# Patient Record
Sex: FEMALE | Race: WHITE | NOT HISPANIC OR LATINO | URBAN - METROPOLITAN AREA
[De-identification: names, ages, dates, MRNs, and addresses within clinical notes are randomized per-mention and may not be internally consistent; named-entity substitution may affect disease eponyms.]

---

## 2018-11-03 ENCOUNTER — EMERGENCY (EMERGENCY)
Facility: HOSPITAL | Age: 53
LOS: 1 days | Discharge: HOME | End: 2018-11-03
Payer: COMMERCIAL

## 2018-11-03 VITALS
HEART RATE: 62 BPM | RESPIRATION RATE: 17 BRPM | OXYGEN SATURATION: 99 % | WEIGHT: 130.07 LBS | TEMPERATURE: 98 F | SYSTOLIC BLOOD PRESSURE: 102 MMHG | DIASTOLIC BLOOD PRESSURE: 68 MMHG | HEIGHT: 68 IN

## 2018-11-03 VITALS
SYSTOLIC BLOOD PRESSURE: 124 MMHG | HEART RATE: 55 BPM | TEMPERATURE: 97 F | DIASTOLIC BLOOD PRESSURE: 72 MMHG | RESPIRATION RATE: 14 BRPM | OXYGEN SATURATION: 100 %

## 2018-11-03 LAB
ALBUMIN SERPL ELPH-MCNC: 4.1 G/DL — SIGNIFICANT CHANGE UP (ref 3.3–5)
ALP SERPL-CCNC: 56 U/L — SIGNIFICANT CHANGE UP (ref 30–120)
ALT FLD-CCNC: 20 U/L DA — SIGNIFICANT CHANGE UP (ref 10–60)
ANION GAP SERPL CALC-SCNC: 8 MMOL/L — SIGNIFICANT CHANGE UP (ref 5–17)
APPEARANCE UR: CLEAR — SIGNIFICANT CHANGE UP
APPEARANCE UR: CLEAR — SIGNIFICANT CHANGE UP
AST SERPL-CCNC: 13 U/L — SIGNIFICANT CHANGE UP (ref 10–40)
BACTERIA # UR AUTO: ABNORMAL
BACTERIA # UR AUTO: ABNORMAL
BASOPHILS # BLD AUTO: 0.04 K/UL — SIGNIFICANT CHANGE UP (ref 0–0.2)
BASOPHILS NFR BLD AUTO: 0.5 % — SIGNIFICANT CHANGE UP (ref 0–2)
BILIRUB SERPL-MCNC: 0.3 MG/DL — SIGNIFICANT CHANGE UP (ref 0.2–1.2)
BILIRUB UR-MCNC: NEGATIVE — SIGNIFICANT CHANGE UP
BILIRUB UR-MCNC: NEGATIVE — SIGNIFICANT CHANGE UP
BUN SERPL-MCNC: 13 MG/DL — SIGNIFICANT CHANGE UP (ref 7–23)
CALCIUM SERPL-MCNC: 9.1 MG/DL — SIGNIFICANT CHANGE UP (ref 8.4–10.5)
CANCER AG125 SERPL-ACNC: 11 U/ML — SIGNIFICANT CHANGE UP
CHLORIDE SERPL-SCNC: 105 MMOL/L — SIGNIFICANT CHANGE UP (ref 96–108)
CO2 SERPL-SCNC: 30 MMOL/L — SIGNIFICANT CHANGE UP (ref 22–31)
COLOR SPEC: YELLOW — SIGNIFICANT CHANGE UP
COLOR SPEC: YELLOW — SIGNIFICANT CHANGE UP
CREAT SERPL-MCNC: 0.76 MG/DL — SIGNIFICANT CHANGE UP (ref 0.5–1.3)
DIFF PNL FLD: ABNORMAL
DIFF PNL FLD: ABNORMAL
EOSINOPHIL # BLD AUTO: 0.12 K/UL — SIGNIFICANT CHANGE UP (ref 0–0.5)
EOSINOPHIL NFR BLD AUTO: 1.6 % — SIGNIFICANT CHANGE UP (ref 0–6)
EPI CELLS # UR: SIGNIFICANT CHANGE UP
EPI CELLS # UR: SIGNIFICANT CHANGE UP
ERYTHROCYTE [SEDIMENTATION RATE] IN BLOOD: 3 MM/HR — SIGNIFICANT CHANGE UP (ref 0–20)
GLUCOSE SERPL-MCNC: 96 MG/DL — SIGNIFICANT CHANGE UP (ref 70–99)
GLUCOSE UR QL: NEGATIVE MG/DL — SIGNIFICANT CHANGE UP
GLUCOSE UR QL: NEGATIVE MG/DL — SIGNIFICANT CHANGE UP
HCT VFR BLD CALC: 38.4 % — SIGNIFICANT CHANGE UP (ref 34.5–45)
HGB BLD-MCNC: 12.8 G/DL — SIGNIFICANT CHANGE UP (ref 11.5–15.5)
IMM GRANULOCYTES NFR BLD AUTO: 0.5 % — SIGNIFICANT CHANGE UP (ref 0–1.5)
KETONES UR-MCNC: NEGATIVE — SIGNIFICANT CHANGE UP
KETONES UR-MCNC: NEGATIVE — SIGNIFICANT CHANGE UP
LEUKOCYTE ESTERASE UR-ACNC: ABNORMAL
LEUKOCYTE ESTERASE UR-ACNC: ABNORMAL
LYMPHOCYTES # BLD AUTO: 1.52 K/UL — SIGNIFICANT CHANGE UP (ref 1–3.3)
LYMPHOCYTES # BLD AUTO: 20.6 % — SIGNIFICANT CHANGE UP (ref 13–44)
MCHC RBC-ENTMCNC: 30.2 PG — SIGNIFICANT CHANGE UP (ref 27–34)
MCHC RBC-ENTMCNC: 33.3 GM/DL — SIGNIFICANT CHANGE UP (ref 32–36)
MCV RBC AUTO: 90.6 FL — SIGNIFICANT CHANGE UP (ref 80–100)
MONOCYTES # BLD AUTO: 0.4 K/UL — SIGNIFICANT CHANGE UP (ref 0–0.9)
MONOCYTES NFR BLD AUTO: 5.4 % — SIGNIFICANT CHANGE UP (ref 2–14)
NEUTROPHILS # BLD AUTO: 5.27 K/UL — SIGNIFICANT CHANGE UP (ref 1.8–7.4)
NEUTROPHILS NFR BLD AUTO: 71.4 % — SIGNIFICANT CHANGE UP (ref 43–77)
NITRITE UR-MCNC: NEGATIVE — SIGNIFICANT CHANGE UP
NITRITE UR-MCNC: NEGATIVE — SIGNIFICANT CHANGE UP
NRBC # BLD: 0 /100 WBCS — SIGNIFICANT CHANGE UP (ref 0–0)
PH UR: 5 — SIGNIFICANT CHANGE UP (ref 5–8)
PH UR: 7 — SIGNIFICANT CHANGE UP (ref 5–8)
PLATELET # BLD AUTO: 196 K/UL — SIGNIFICANT CHANGE UP (ref 150–400)
POTASSIUM SERPL-MCNC: 4.2 MMOL/L — SIGNIFICANT CHANGE UP (ref 3.5–5.3)
POTASSIUM SERPL-SCNC: 4.2 MMOL/L — SIGNIFICANT CHANGE UP (ref 3.5–5.3)
PROT SERPL-MCNC: 7.2 G/DL — SIGNIFICANT CHANGE UP (ref 6–8.3)
PROT UR-MCNC: NEGATIVE MG/DL — SIGNIFICANT CHANGE UP
PROT UR-MCNC: NEGATIVE MG/DL — SIGNIFICANT CHANGE UP
RBC # BLD: 4.24 M/UL — SIGNIFICANT CHANGE UP (ref 3.8–5.2)
RBC # FLD: 12.4 % — SIGNIFICANT CHANGE UP (ref 10.3–14.5)
RBC CASTS # UR COMP ASSIST: ABNORMAL /HPF (ref 0–4)
RBC CASTS # UR COMP ASSIST: SIGNIFICANT CHANGE UP /HPF (ref 0–4)
SODIUM SERPL-SCNC: 143 MMOL/L — SIGNIFICANT CHANGE UP (ref 135–145)
SP GR SPEC: 1.01 — SIGNIFICANT CHANGE UP (ref 1.01–1.02)
SP GR SPEC: 1.02 — SIGNIFICANT CHANGE UP (ref 1.01–1.02)
UROBILINOGEN FLD QL: 1 MG/DL
UROBILINOGEN FLD QL: NEGATIVE MG/DL — SIGNIFICANT CHANGE UP
WBC # BLD: 7.39 K/UL — SIGNIFICANT CHANGE UP (ref 3.8–10.5)
WBC # FLD AUTO: 7.39 K/UL — SIGNIFICANT CHANGE UP (ref 3.8–10.5)
WBC UR QL: ABNORMAL
WBC UR QL: SIGNIFICANT CHANGE UP

## 2018-11-03 PROCEDURE — 84480 ASSAY TRIIODOTHYRONINE (T3): CPT

## 2018-11-03 PROCEDURE — 86304 IMMUNOASSAY TUMOR CA 125: CPT

## 2018-11-03 PROCEDURE — 84443 ASSAY THYROID STIM HORMONE: CPT

## 2018-11-03 PROCEDURE — 85027 COMPLETE CBC AUTOMATED: CPT

## 2018-11-03 PROCEDURE — 84436 ASSAY OF TOTAL THYROXINE: CPT

## 2018-11-03 PROCEDURE — 81001 URINALYSIS AUTO W/SCOPE: CPT

## 2018-11-03 PROCEDURE — 86618 LYME DISEASE ANTIBODY: CPT

## 2018-11-03 PROCEDURE — 71046 X-RAY EXAM CHEST 2 VIEWS: CPT | Mod: 26

## 2018-11-03 PROCEDURE — 80053 COMPREHEN METABOLIC PANEL: CPT

## 2018-11-03 PROCEDURE — 71046 X-RAY EXAM CHEST 2 VIEWS: CPT

## 2018-11-03 PROCEDURE — 93005 ELECTROCARDIOGRAM TRACING: CPT

## 2018-11-03 PROCEDURE — 85652 RBC SED RATE AUTOMATED: CPT

## 2018-11-03 PROCEDURE — 93010 ELECTROCARDIOGRAM REPORT: CPT

## 2018-11-03 PROCEDURE — 99284 EMERGENCY DEPT VISIT MOD MDM: CPT

## 2018-11-03 PROCEDURE — 36415 COLL VENOUS BLD VENIPUNCTURE: CPT

## 2018-11-03 PROCEDURE — 99283 EMERGENCY DEPT VISIT LOW MDM: CPT | Mod: 25

## 2018-11-03 RX ORDER — AMOXICILLIN 250 MG/5ML
0 SUSPENSION, RECONSTITUTED, ORAL (ML) ORAL
Qty: 0 | Refills: 0 | COMMUNITY

## 2018-11-03 RX ORDER — SODIUM CHLORIDE 9 MG/ML
1000 INJECTION INTRAMUSCULAR; INTRAVENOUS; SUBCUTANEOUS ONCE
Qty: 0 | Refills: 0 | Status: COMPLETED | OUTPATIENT
Start: 2018-11-03 | End: 2018-11-03

## 2018-11-03 RX ADMIN — SODIUM CHLORIDE 1000 MILLILITER(S): 9 INJECTION INTRAMUSCULAR; INTRAVENOUS; SUBCUTANEOUS at 17:00

## 2018-11-03 NOTE — ED ADULT TRIAGE NOTE - NSWEIGHTCALCTOOLDRUG_GEN_A_CORE
[facility] [place] INTERNAL MEDICINE  SHARRI FARRIS M.D.      Maria Esther FLORENCE Godfrey / 48 y.o. / female  08/23/2018      ASSESSMENT & PLAN:    Problem List Items Addressed This Visit        High    Morbid obesity (CMS/HCC) - Primary (Chronic)    Current Assessment & Plan     · The following were discussed: low calorie, low carb based diet program, portion control, make dinner lightest meal of day, avoid eating sweets, desserts, and excess amount of fruits and increase physical activity, monitor steps taken daily            Relevant Orders    Hemoglobin A1c    Lipid Panel With / Chol / HDL Ratio    Comprehensive Metabolic Panel    TSH+Free T4    CBC & Differential       Medium    Asthma (Chronic)    Overview     Albuterol HFA prn.          Current Assessment & Plan     Rx sent to pharmacy for albuterol HFA inhaler.           Relevant Medications    albuterol (VENTOLIN HFA) 108 (90 Base) MCG/ACT inhaler       Low    Environmental and seasonal allergies (Chronic)    Current Assessment & Plan     Recommended Zyrtec/Allegra daily. Rx sent to pharmacy for Flonase spray.          Relevant Medications    fluticasone (FLONASE) 50 MCG/ACT nasal spray    Attention deficit hyperactivity disorder (ADHD), combined type (Chronic)    Overview     *Sees psychiatrist (Johnson)    On Vyvanse 50 mg daily. Continue follow-up with psychiatrist.          Relevant Medications    lisdexamfetamine (VYVANSE) 50 MG capsule    Chronic left-sided low back pain (Chronic)    Current Assessment & Plan     Persistent sacral and lumbar region back pain. Xray from [facility] reviewed showing spondylosis but no vertebral compression fracture. Started after a fall previously. Recommended PT and OTC NSAIDs as needed.            Other Visit Diagnoses     Snoring        Relevant Orders    Ambulatory Referral to Sleep Medicine    Routine health maintenance        Relevant Orders    Ambulatory Referral to Gynecology    Mammo Screening Bilateral With CAD        Orders Placed This Encounter  "  Procedures   • Mammo Screening Bilateral With CAD   • Hemoglobin A1c   • Lipid Panel With / Chol / HDL Ratio   • Comprehensive Metabolic Panel   • TSH+Free T4   • Ambulatory Referral to Gynecology   • Ambulatory Referral to Sleep Medicine   • CBC & Differential     New Medications Ordered This Visit   Medications   • albuterol (VENTOLIN HFA) 108 (90 Base) MCG/ACT inhaler     Sig: Inhale 2 puffs Every 4 (Four) Hours As Needed for Wheezing or Shortness of Air.     Dispense:  1 inhaler     Refill:  2     May substitute to a different brand if needed for insurance formulary reasons.   • fluticasone (FLONASE) 50 MCG/ACT nasal spray     Si spray into the nostril(s) as directed by provider 2 (Two) Times a Day.     Dispense:  1 bottle     Refill:  5       Summary/Discussion:      Return in about 4 months (around 2018) for Annual physical.    ____________________________________________________________________    VITALS:    Visit Vitals  /84   Pulse 112   Temp 98.2 °F (36.8 °C)   Ht 151.8 cm (59.75\")   Wt 131 kg (289 lb)   SpO2 99%   BMI 56.91 kg/m²       BP Readings from Last 3 Encounters:   18 122/84     Wt Readings from Last 3 Encounters:   18 131 kg (289 lb)      Body mass index is 56.91 kg/m².    CC: Main reason(s) for today's visit: Establish Care (Former Dr De La Cruz pt) and back issue (2 years)      HPI:    Patient is a 48 y.o. female who is here to establish care. She and her  are both psychiatric nurses. She sees psychiatry for ADD/ADHD and takes Vyvanse intermittently. She complains of chronic insomnia \"I don't sleep\". She has tried several different medications without improvement. She has prominent family history of bipolar disorder and although she has had recurring depression history never had a discernible manic episode. She is witnessed to have intermittent snoring, heavy breathing and awakens throughout night of sleep. She has intermittent asthma and allergies but does not " have a rescue inhaler and does not take allergy medications consistently. Morbid obesity (childhood/young adult history of bulimia, may have been victim to Munchhausen syndrome by proxy by mother) and is interested in losing weight as her  is striving for weight loss. She does not exercise and admits to eating too much. She complains of chronic left side low back/sacral region back pain starting after a fall. Xray report from [facility] showed spondylosis but no acute fracture. SI joints were intact. Does not like taking medications but is open to PT.       Patient Care Team:  Luiz Uribe MD as PCP - General (Internal Medicine)  ____________________________________________________________________      REVIEW OF SYSTEMS    Review of Systems   Constitutional: Negative for fatigue and unexpected weight change.   HENT: Negative.    Eyes: Negative.    Respiratory: Negative.  Apnea: heavy breathing. Shortness of breath: asthma related at times.    Cardiovascular: Negative.  Negative for chest pain.   Gastrointestinal: Negative.  Negative for abdominal pain.   Endocrine: Negative.         Morbid obesity   Genitourinary: Negative.  Negative for dysuria and pelvic pain.        Nocturia 2 x a night    Musculoskeletal: Positive for back pain.   Skin: Negative.    Allergic/Immunologic: Positive for environmental allergies.   Neurological: Positive for weakness. Negative for numbness and headaches.   Hematological: Negative.    Psychiatric/Behavioral: Positive for sleep disturbance.        Attention deficit         PHYSICAL EXAMINATION    Physical Exam   Constitutional: She is oriented to person, place, and time. She appears well-developed and well-nourished. No distress.   Morbidly obese    HENT:   Head: Normocephalic and atraumatic.   Right Ear: Tympanic membrane normal.   Left Ear: Tympanic membrane normal.   Mouth/Throat: Oropharynx is clear and moist and mucous membranes are normal. No oral lesions.   Small posterior  airway   Eyes: Pupils are equal, round, and reactive to light. Conjunctivae are normal. No scleral icterus.   Neck: Neck supple. No tracheal deviation present. No thyroid mass and no thyromegaly present.   Cardiovascular: Normal rate, regular rhythm, normal heart sounds and intact distal pulses.    Pulmonary/Chest: Effort normal and breath sounds normal.   Abdominal: Soft. Bowel sounds are normal. She exhibits no distension and no mass. There is no tenderness. No hernia.   Musculoskeletal: She exhibits no edema.   Lymphadenopathy:     She has no cervical adenopathy.        Right: No supraclavicular adenopathy present.        Left: No supraclavicular adenopathy present.   Neurological: She is alert and oriented to person, place, and time. She has normal reflexes. No cranial nerve deficit. She exhibits normal muscle tone. Coordination normal.   Skin: Skin is warm. No rash noted. No pallor.   Psychiatric: She has a normal mood and affect. Her behavior is normal. Judgment and thought content normal.       REVIEWED DATA:    Labs:   No results found for: NA, K, AST, ALT, BUN, CREATININE, EGFRIFNONA, EGFRIFAFRI    No results found for: GLUCOSE, HGBA1C, MICROALBUR    No results found for: LDL, HDL, TRIG, CHOLHDLRATIO    No results found for: TSH, FREET4     No results found for: WBC, HGB, PLT      Imaging:   XR L SPINE COMP W OBLIQUES, XR SACRUM AND COCCYX MIN 2 VWS    DATE: 04/03/2018 at 15:17 hours.    COMPARISON: None available    INDICATION: slipped on wet wooden floor 4 days ago at home. Has lower back and coccyx pain..    FINDINGS:     Lumbar spine: There is a large body habitus. There is multilevel lumbar spondylosis with spurring of the endplates anteriorly and disc space narrowing worse at the upper lumbar spine. There is also straightening of the lordotic curvature which may be   related to muscle spasm. No acute fracture or spondylolisthesis is visualized.    Sacrum and coccyx: The bilateral sacroiliac joints are  symmetric. No displaced fracture or cortical irregularity is identified.    IMPRESSION:   1. Multilevel lumbar spondylosis and large body habitus.  2. Straightening of the lumbar spine which could be related to muscle spasm     Medical Tests:        Summary of old records / correspondence / consultant report:        Request outside records:   ** Outside records requested and obtained through [facility] Everywhere. I reviewed  the the following information from [facility] : imaging report(s) : Xray of lumbar and sacrum as noted above        ______________________________________________________________________    ALLERGIES  Allergies   Allergen Reactions   • Apple Hives   • Peanut Oil Anaphylaxis   • Penicillins Hives and Shortness Of Breath   • Codeine Hives and Nausea And Vomiting   • Epinephrine Nausea And Vomiting, Palpitations and Photosensitivity        MEDICATIONS  Current Outpatient Prescriptions   Medication Sig Dispense Refill   • lisdexamfetamine (VYVANSE) 50 MG capsule Take 50 mg by mouth     • albuterol (VENTOLIN HFA) 108 (90 Base) MCG/ACT inhaler Inhale 2 puffs Every 4 (Four) Hours As Needed for Wheezing or Shortness of Air. 1 inhaler 2   • fluticasone (FLONASE) 50 MCG/ACT nasal spray 1 spray into the nostril(s) as directed by provider 2 (Two) Times a Day. 1 bottle 5     No current facility-administered medications for this visit.        PFSH:     The following portions of the patient's history were reviewed and updated as appropriate: Allergies / Current Medications / Past Medical History / Surgical History / Social History / Family History    PROBLEM LIST   Patient Active Problem List   Diagnosis   • Asthma   • Environmental and seasonal allergies   • Morbid obesity (CMS/HCC)   • Attention deficit hyperactivity disorder (ADHD), combined type   • Chronic left-sided low back pain   • Chronic insomnia       PAST MEDICAL HISTORY  Past Medical History:   Diagnosis Date   • History of bulimia     teenage  years/young adult       SURGICAL HISTORY  Past Surgical History:   Procedure Laterality Date   • NO PAST SURGERIES         SOCIAL HISTORY  Social History     Social History   • Marital status:    • Number of children: 0     Occupational History   • RN       Physcatric     Social History Main Topics   • Smoking status: Former Smoker     Years: 5.00     Types: Cigarettes     Quit date: 2014   • Smokeless tobacco: Never Used   • Alcohol use No   • Drug use: No   • Sexual activity: Yes     Partners: Male     Other Topics Concern   • Not on file       FAMILY HISTORY  Family History   Problem Relation Age of Onset   • Heart disease Mother    • Kidney disease Mother    • Alzheimer's disease Father    • Bipolar disorder Father    • Alzheimer's disease Paternal Aunt    • Alzheimer's disease Paternal Grandfather    • Bipolar disorder Sister    • Bipolar disorder Brother    • Bipolar disorder Sister          **Dhiraj Disclaimer:   Much of this encounter note is an electronic transcription/translation of spoken language to printed text. The electronic translation of spoken language may permit erroneous, or at times, nonsensical words or phrases to be inadvertently transcribed. Although I have reviewed the note for such errors, some may still exist.     used

## 2018-11-03 NOTE — ED PROVIDER NOTE - SKIN, MLM
Skin normal color for race, warm, dry and intact. No evidence of rash. Numerous freckles noted on lower extremities.

## 2018-11-03 NOTE — ED PROVIDER NOTE - CONSTITUTIONAL, MLM
normal... Well appearing, well nourished, awake, alert, oriented to person, place, time/situation. Slightly depressed appearing.

## 2018-11-03 NOTE — ED ADULT NURSE NOTE - CHPI ED NUR SYMPTOMS NEG
no headache/no loss of consciousness/no vomiting/no nausea/no fever/no decreased eating/drinking/no dizziness/no back pain

## 2018-11-03 NOTE — ED PROVIDER NOTE - OBJECTIVE STATEMENT
54 y/o F pt with PMHx of fatigue for 2 months associated with insomnia presents to the ED c/o persistent and worsening fatigue, sinus pressure in maxillary sinus region. Pt's son recently had Coxsackie virus, other son had virus of undetermined origin. Pt became more fatigued and had canker sores recently when sons were sick. Currently has resolving cancker sore under tongue. Insomnia treated with Ambien with sporadic results. Pt had workup done with Ortega Fabian a few months ago that was unremarkable, saw him yesterday for pressure in sinus and started Amoxicillin. She is currently dealing with a stressful law suit that is ongoing. Reports continued difficulty sleeping. States  snores and gets interrupted sleep, sleeps in different room. Confirms mild migraine HA, takes Excedrin with relief. Currently has no pain. Denies fever, SOB, CP, joint pain, rashes. Normal BM and urinates well, IUD in place and hasn't had Menses in months. Hasn't had dental procedures in past 6 months. No foreign travel.  No further complaints at this time. 52 y/o F pt with PMHx of fatigue for 2 months associated with insomnia presents to the ED c/o persistent and worsening fatigue, sinus pressure in maxillary sinus region. Pt's son recently had Coxsackie virus, other son had virus of undetermined origin. Pt became more fatigued and had canker sores recently when sons were sick. Currently has resolving canker sore under tongue. Insomnia treated with Ambien with sporadic results. Pt had workup done with Ortega Fabian a few months ago that was unremarkable, saw him yesterday for pressure in sinus and started Amoxicillin. She is currently dealing with a stressful law suit that is ongoing. Reports continued difficulty sleeping. States  snores and gets interrupted sleep, sleeps in different room. Confirms mild migraine HA, takes Excedrin with relief. Currently has no pain. Denies fever, SOB, CP, joint pain, rashes. Normal BM and urinates well, IUD in place and hasn't had Menses in months. Hasn't had dental procedures in past 6 months. No foreign travel.  No further complaints at this time. 54 y/o F pt with PMHx of fatigue for 2 months associated with insomnia presents to the ED c/o persistent and worsening fatigue, sinus pressure in maxillary sinus region. Pt's son recently had Coxsackie virus, other son had virus of undetermined origin. Pt became more fatigued and had canker sores recently when sons were sick. Currently has resolving canker sore under tongue. Insomnia treated with Ambien with sporadic results. Pt had workup done with Ortega Fabian a few months ago that was unremarkable, saw him yesterday for pressure in sinus and started Amoxicillin. She is currently dealing with a stressful law suit that is ongoing. Reports continued difficulty sleeping. States  snores and gets interrupted sleep, sleeps in different room. Confirms mild migraine HA, takes Excedrin with relief. Currently has no pain. Denies fever, SOB, CP, joint pain, rashes. Normal BM and urinates well, IUD in place and hasn't had menses in months. Hasn't had dental procedures in past 6 months. No foreign travel.  No further complaints at this time.

## 2018-11-03 NOTE — ED PROVIDER NOTE - MEDICAL DECISION MAKING DETAILS
54 y/o F pt c/o persistent and worsening fatigue, sinus pressure in maxillary sinus region. Will do labs, XR and EKG to attempt to determine the underlying cause of chronic fatigue.

## 2018-11-03 NOTE — ED ADULT NURSE NOTE - NSIMPLEMENTINTERV_GEN_ALL_ED
Implemented All Universal Safety Interventions:  Bruce to call system. Call bell, personal items and telephone within reach. Instruct patient to call for assistance. Room bathroom lighting operational. Non-slip footwear when patient is off stretcher. Physically safe environment: no spills, clutter or unnecessary equipment. Stretcher in lowest position, wheels locked, appropriate side rails in place.

## 2018-11-03 NOTE — ED ADULT NURSE REASSESSMENT NOTE - NS ED NURSE REASSESS COMMENT FT1
c collar removed by md and neck pains better pt offered and declined pain meds pt pending xray results

## 2018-11-03 NOTE — ED ADULT NURSE NOTE - OBJECTIVE STATEMENT
weakness for 2 months saw pmd without relief no joint pains or swelling no rash history saw pmd last week  thursday and started on amoxicillin

## 2018-11-03 NOTE — ED PROVIDER NOTE - ENMT, MLM
Airway patent, Nasal mucosa clear. Mouth with normal mucosa. Throat has no vesicles, no oropharyngeal exudates and uvula is midline. Small healing aphthous stomatitis under tongue. No lymphadenopathy. Neck supple. Slightly bilateral maxillary tenderness, no swelling or erythema.

## 2018-11-04 LAB
B BURGDOR C6 AB SER-ACNC: NEGATIVE — SIGNIFICANT CHANGE UP
B BURGDOR IGG+IGM SER-ACNC: 0.12 INDEX — SIGNIFICANT CHANGE UP (ref 0.01–0.89)
T3 SERPL-MCNC: 94 NG/DL — SIGNIFICANT CHANGE UP (ref 80–200)
T4 AB SER-ACNC: 8.3 UG/DL — SIGNIFICANT CHANGE UP (ref 4.6–12)
TSH SERPL-MCNC: 0.48 UIU/ML — SIGNIFICANT CHANGE UP (ref 0.27–4.2)

## 2019-02-12 ENCOUNTER — RESULT REVIEW (OUTPATIENT)
Age: 54
End: 2019-02-12

## 2021-05-06 ENCOUNTER — APPOINTMENT (OUTPATIENT)
Dept: OBGYN | Facility: CLINIC | Age: 56
End: 2021-05-06
Payer: COMMERCIAL

## 2021-05-06 VITALS
DIASTOLIC BLOOD PRESSURE: 78 MMHG | HEIGHT: 68 IN | WEIGHT: 132 LBS | SYSTOLIC BLOOD PRESSURE: 122 MMHG | BODY MASS INDEX: 20 KG/M2

## 2021-05-06 DIAGNOSIS — Z97.5 PRESENCE OF (INTRAUTERINE) CONTRACEPTIVE DEVICE: ICD-10-CM

## 2021-05-06 DIAGNOSIS — Z01.419 ENCOUNTER FOR GYNECOLOGICAL EXAMINATION (GENERAL) (ROUTINE) W/OUT ABNORMAL FINDINGS: ICD-10-CM

## 2021-05-06 LAB
BILIRUB UR QL STRIP: NORMAL
CLARITY UR: CLEAR
COLLECTION METHOD: NORMAL
GLUCOSE UR-MCNC: NORMAL
HCG UR QL: 0.2 EU/DL
HGB UR QL STRIP.AUTO: NORMAL
KETONES UR-MCNC: NORMAL
LEUKOCYTE ESTERASE UR QL STRIP: NORMAL
NITRITE UR QL STRIP: NORMAL
PH UR STRIP: 8
PROT UR STRIP-MCNC: NORMAL
SP GR UR STRIP: 1.02

## 2021-05-06 PROCEDURE — 99072 ADDL SUPL MATRL&STAF TM PHE: CPT

## 2021-05-06 PROCEDURE — 82274 ASSAY TEST FOR BLOOD FECAL: CPT | Mod: NC,QW

## 2021-05-06 PROCEDURE — 99213 OFFICE O/P EST LOW 20 MIN: CPT | Mod: 25

## 2021-05-06 PROCEDURE — 58301 REMOVE INTRAUTERINE DEVICE: CPT

## 2021-05-06 PROCEDURE — 99396 PREV VISIT EST AGE 40-64: CPT

## 2021-05-06 PROCEDURE — 81003 URINALYSIS AUTO W/O SCOPE: CPT | Mod: QW

## 2021-05-06 PROCEDURE — 77080 DXA BONE DENSITY AXIAL: CPT

## 2021-05-08 LAB — HPV HIGH+LOW RISK DNA PNL CVX: NOT DETECTED

## 2021-05-11 LAB — CYTOLOGY CVX/VAG DOC THIN PREP: ABNORMAL

## 2022-09-30 ENCOUNTER — APPOINTMENT (OUTPATIENT)
Dept: OBGYN | Facility: CLINIC | Age: 57
End: 2022-09-30
